# Patient Record
(demographics unavailable — no encounter records)

---

## 2025-03-11 NOTE — PHYSICAL EXAM
[General Appearance - Well Developed] : well developed [General Appearance - Well Nourished] : well nourished [] : no respiratory distress [Respiration, Rhythm And Depth] : normal respiratory rhythm and effort [Urethral Meatus] : meatus normal [Penis Abnormality] : normal circumcised penis [Oriented To Time, Place, And Person] : oriented to person, place, and time [Chaperone Present] : A chaperone was present in the examining room during all aspects of the physical examination [de-identified] : bilatera marked gynecomastia grade 4gynecomastia  [de-identified] : bilateral vasa palpable.  no varicocele no hernias 5 cc testes bialteral no masses [FreeTextEntry2] : simi

## 2025-03-11 NOTE — HISTORY OF PRESENT ILLNESS
[FreeTextEntry1] : referred by Dr. Cantrell who was seeing him for TRT has been on testopel for 5 years.  last testopel 2 months   Had started testosterone for delayed puberty and took from 16-21 with injections monthly,  started injections again last 10 year.   pituitary interruption syndrome since childhood  PCOS in partner 34 years old neither in a prior pregnancy

## 2025-07-09 NOTE — HISTORY OF PRESENT ILLNESS
[FreeTextEntry1] : pt returns His last test to palpate placement was in December 12, 2024.  He reports approximately 2 weeks ago he noticed decreasing libido and some changes.  He reports he has not had any changes otherwise his partner has not seen a reproductive reproductive endocrinologist.  He did have a DEXA scan which is normal.  noted previously: referred by Dr. Cantrell who was seeing him for TRT has been on testopel for 5 years.  last testopel 2 months   Had started testosterone for delayed puberty and took from 16-21 with injections monthly,  started injections again last 10 year.   pituitary interruption syndrome since childhood  PCOS in partner 34 years old neither in a prior pregnancy

## 2025-07-09 NOTE — PHYSICAL EXAM
[General Appearance - Well Developed] : well developed [General Appearance - Well Nourished] : well nourished [Skin Color & Pigmentation] : normal skin color and pigmentation [Skin Turgor] : supple